# Patient Record
Sex: FEMALE | URBAN - METROPOLITAN AREA
[De-identification: names, ages, dates, MRNs, and addresses within clinical notes are randomized per-mention and may not be internally consistent; named-entity substitution may affect disease eponyms.]

---

## 2018-08-09 ENCOUNTER — APPOINTMENT (RX ONLY)
Dept: URBAN - METROPOLITAN AREA CLINIC 2 | Facility: CLINIC | Age: 63
Setting detail: DERMATOLOGY
End: 2018-08-09

## 2018-08-09 DIAGNOSIS — L30.0 NUMMULAR DERMATITIS: ICD-10-CM

## 2018-08-09 PROBLEM — J30.1 ALLERGIC RHINITIS DUE TO POLLEN: Status: ACTIVE | Noted: 2018-08-09

## 2018-08-09 PROBLEM — J45.909 UNSPECIFIED ASTHMA, UNCOMPLICATED: Status: ACTIVE | Noted: 2018-08-09

## 2018-08-09 PROCEDURE — ? COUNSELING

## 2018-08-09 PROCEDURE — ? PRESCRIPTION

## 2018-08-09 PROCEDURE — 99201: CPT

## 2018-08-09 RX ORDER — CLOBETASOL PROPIONATE 0.5 MG/G
1 OINTMENT TOPICAL BID
Qty: 1 | Refills: 3 | Status: ERX | COMMUNITY
Start: 2018-08-09

## 2018-08-09 RX ADMIN — CLOBETASOL PROPIONATE 1: 0.5 OINTMENT TOPICAL at 00:00

## 2018-08-09 ASSESSMENT — LOCATION SIMPLE DESCRIPTION DERM: LOCATION SIMPLE: RIGHT FOOT

## 2018-08-09 ASSESSMENT — LOCATION DETAILED DESCRIPTION DERM: LOCATION DETAILED: RIGHT LATERAL MALLEOLUS

## 2018-08-09 ASSESSMENT — LOCATION ZONE DERM: LOCATION ZONE: FEET

## 2022-02-24 ENCOUNTER — APPOINTMENT (RX ONLY)
Dept: URBAN - METROPOLITAN AREA CLINIC 2 | Facility: CLINIC | Age: 67
Setting detail: DERMATOLOGY
End: 2022-02-24

## 2022-02-24 DIAGNOSIS — L30.0 NUMMULAR DERMATITIS: ICD-10-CM | Status: INADEQUATELY CONTROLLED

## 2022-02-24 DIAGNOSIS — I83.89 VARICOSE VEINS OF LOWER EXTREMITIES WITH OTHER COMPLICATIONS: ICD-10-CM

## 2022-02-24 PROBLEM — I83.891 VARICOSE VEINS OF RIGHT LOWER EXTREMITY WITH OTHER COMPLICATIONS: Status: ACTIVE | Noted: 2022-02-24

## 2022-02-24 PROCEDURE — 99204 OFFICE O/P NEW MOD 45 MIN: CPT

## 2022-02-24 PROCEDURE — ? PRESCRIPTION

## 2022-02-24 PROCEDURE — ? COUNSELING

## 2022-02-24 PROCEDURE — ? TREATMENT REGIMEN

## 2022-02-24 RX ORDER — MOMETASONE FUROATE 1 MG/G
1 OINTMENT TOPICAL BID PRN
Qty: 45 | Refills: 2 | Status: ERX | COMMUNITY
Start: 2022-02-24

## 2022-02-24 RX ADMIN — MOMETASONE FUROATE 1: 1 OINTMENT TOPICAL at 00:00

## 2022-02-24 ASSESSMENT — LOCATION SIMPLE DESCRIPTION DERM
LOCATION SIMPLE: RIGHT PRETIBIAL REGION
LOCATION SIMPLE: RIGHT ANKLE

## 2022-02-24 ASSESSMENT — LOCATION DETAILED DESCRIPTION DERM
LOCATION DETAILED: RIGHT ANKLE
LOCATION DETAILED: RIGHT ANTERIOR MEDIAL MALLEOLUS
LOCATION DETAILED: RIGHT DISTAL PRETIBIAL REGION

## 2022-02-24 ASSESSMENT — LOCATION ZONE DERM: LOCATION ZONE: LEG

## 2022-02-24 NOTE — PROCEDURE: TREATMENT REGIMEN
Plan: Recommended after showering apply a thick moisturizer cream twice a daily, if needed may apply topical steroid cream if needed.
Initiate Treatment: Mometasone ointment bid then as needed.
Detail Level: Simple
Discontinue Regimen: Ketoconazole cream

## 2023-02-23 ENCOUNTER — RX ONLY (OUTPATIENT)
Age: 68
Setting detail: RX ONLY
End: 2023-02-23

## 2023-02-23 RX ORDER — MOMETASONE FUROATE 1 MG/G
1 OINTMENT TOPICAL BID PRN
Qty: 45 | Refills: 0 | Status: ERX